# Patient Record
Sex: MALE | Race: OTHER | HISPANIC OR LATINO | URBAN - METROPOLITAN AREA
[De-identification: names, ages, dates, MRNs, and addresses within clinical notes are randomized per-mention and may not be internally consistent; named-entity substitution may affect disease eponyms.]

---

## 2022-11-23 ENCOUNTER — EMERGENCY (EMERGENCY)
Facility: HOSPITAL | Age: 37
LOS: 1 days | Discharge: HOME | End: 2022-11-23
Attending: STUDENT IN AN ORGANIZED HEALTH CARE EDUCATION/TRAINING PROGRAM
Payer: COMMERCIAL

## 2022-11-23 VITALS
TEMPERATURE: 98 F | DIASTOLIC BLOOD PRESSURE: 77 MMHG | SYSTOLIC BLOOD PRESSURE: 134 MMHG | RESPIRATION RATE: 20 BRPM | OXYGEN SATURATION: 98 % | HEART RATE: 73 BPM

## 2022-11-23 PROCEDURE — 99283 EMERGENCY DEPT VISIT LOW MDM: CPT

## 2022-11-23 PROCEDURE — 99284 EMERGENCY DEPT VISIT MOD MDM: CPT

## 2022-11-23 RX ORDER — IBUPROFEN 200 MG
600 TABLET ORAL ONCE
Refills: 0 | Status: COMPLETED | OUTPATIENT
Start: 2022-11-23 | End: 2022-11-23

## 2022-11-23 RX ORDER — CYCLOBENZAPRINE HYDROCHLORIDE 10 MG/1
1 TABLET, FILM COATED ORAL
Qty: 20 | Refills: 0
Start: 2022-11-23

## 2022-11-23 RX ORDER — LIDOCAINE 4 G/100G
1 CREAM TOPICAL ONCE
Refills: 0 | Status: COMPLETED | OUTPATIENT
Start: 2022-11-23 | End: 2022-11-23

## 2022-11-23 RX ORDER — CYCLOBENZAPRINE HYDROCHLORIDE 10 MG/1
1 TABLET, FILM COATED ORAL
Qty: 10 | Refills: 0
Start: 2022-11-23

## 2022-11-23 RX ADMIN — Medication 600 MILLIGRAM(S): at 19:52

## 2022-11-23 RX ADMIN — LIDOCAINE 1 PATCH: 4 CREAM TOPICAL at 19:52

## 2022-11-23 NOTE — ED PROVIDER NOTE - CLINICAL SUMMARY MEDICAL DECISION MAKING FREE TEXT BOX
Patient presents with bilateral lower back pain. No spinal tenderness, numbness, tingling, urinary issues. 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 Pain meds and muscle relaxer prescribed.

## 2022-11-23 NOTE — ED PROVIDER NOTE - OBJECTIVE STATEMENT
37 year old male presents with lower bilateral back pain for the last 3-4 days. Patient states the pain was initially mild and then got progressively worse. 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 Patient states he took a "pain pill" this morning but does not recall the name. Movement aggravates the pain. Denies chest pain, abdominal pain, neck pain, headache, nausea, vomiting, and SOB. Denies problems urinating.

## 2022-11-23 NOTE — ED PROVIDER NOTE - PATIENT PORTAL LINK FT
You can access the FollowMyHealth Patient Portal offered by [facility] by registering at the following website: [email or web address]. By joining [employer]’s FollowMyHealth portal, you will also be able to view your health information using other applications (apps) compatible with our system.

## 2022-11-23 NOTE — ED PROVIDER NOTE - NSFOLLOWUPINSTRUCTIONS_ED_ALL_ED_FT
Back Pain    Back pain is very common in adults. The cause of back pain is rarely dangerous and the pain often gets better over time. The cause of your back pain may not be known and may include strain of muscles or ligaments, degeneration of the spinal disks, or arthritis. Occasionally the pain may radiate down your leg(s). Over-the-counter medicines to reduce pain and inflammation are often the most helpful. Stretching and remaining active frequently helps the healing process.     SEEK IMMEDIATE MEDICAL CARE IF YOU HAVE ANY OF THE FOLLOWING SYMPTOMS: bowel or bladder control problems, unusual weakness or numbness in your arms or legs, nausea or vomiting, abdominal pain, fever, dizziness/lightheadedness.    Follow-up with your primary care provider in 1-2 days.  Do not operate heavy machinery or drive your car when taking cyclobenzaprine.

## 2022-11-23 NOTE — ED PROVIDER NOTE - NS ED ROS FT
MSK: lower bilateral back pain. Denies numbness and tingling down arms and legs. Full ROM. Denies neck pain. Denies headache.

## 2022-11-23 NOTE — ED PROVIDER NOTE - PHYSICAL EXAMINATION
MSK: bilateral lower back pain. Full ROM in tact. No spinal tenderness. No bruising noted to lower back.

## 2022-11-23 NOTE — ED ADULT NURSE NOTE - OBJECTIVE STATEMENT
pt to ED with complaints of back pain x 4 days. pt states pain is in lower medial back. pt states pain is 7/10 sharp pain. pt denies any radiation. pt states pain worsens with movement. pt denies any trauma. 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 pt states he took a medication for the pain this morning but is unable to recall the name.

## 2022-11-23 NOTE — ED PROVIDER NOTE - ATTENDING APP SHARED VISIT CONTRIBUTION OF CARE
Pt with a 4 d hx of atraumatic B/L lower back pain. pain worse with movement. no numbness/weakness. no bowel/bladder dysfunction. no CA hx.    physical - mild paraspinal lumbar ttp. normal gait. strength 5/5 B/L LE. sensation intact B/L LE.    plan - Pt with msk back pain. no red flags. given meds in Ed. Pt reassured and given return instructions.

## 2022-11-23 NOTE — ED PROVIDER NOTE - NS ED ATTENDING STATEMENT MOD
This was a shared visit with the ABDI. I reviewed and verified the documentation and independently performed the documented:

## 2024-08-26 NOTE — ED PROVIDER NOTE - CARDIOVASCULAR NEGATIVE STATEMENT, MLM
General Surgery Patient Discharge Instructions    Discharged To:  Home    RESUME ACTIVITY:     WOUND CARE:   Do not remove dressing for 48 hrs.     Leave staples in place until office visit.     BATHING:  Ok to shower in 24 hrs. Do not submerge surgical incisions in water (baths, pools, hot tubs, lakes) until cleared by surgeon at post operative follow up visit.    DRIVING: No driving while on pain medication, in pain, or until seen at follow up visit.    RETURN TO WORK:   Light activity    WALKING:    Yes    LIFTING: Less than 10-15 pounds for 6  weeks     DIET:   Ok to resume regular diet.     MEDICATIONS: Take medications as prescribed by physician. If prescribed narcotic medications (Norco, Percocet, or Roxicodone) attempt to wean off medications as soon as possible.    FOLLOW UP: Call and make appointment to follow up with Dr. Whyte for post op visit in 7-10 days.    
no chest pain and no edema.

## 2025-04-13 ENCOUNTER — EMERGENCY (EMERGENCY)
Facility: HOSPITAL | Age: 40
LOS: 1 days | End: 2025-04-13
Attending: EMERGENCY MEDICINE
Payer: COMMERCIAL

## 2025-04-13 VITALS
DIASTOLIC BLOOD PRESSURE: 90 MMHG | HEIGHT: 72 IN | SYSTOLIC BLOOD PRESSURE: 139 MMHG | TEMPERATURE: 98 F | WEIGHT: 184.97 LBS | HEART RATE: 61 BPM | OXYGEN SATURATION: 99 % | RESPIRATION RATE: 18 BRPM

## 2025-04-13 PROBLEM — Z78.9 OTHER SPECIFIED HEALTH STATUS: Chronic | Status: ACTIVE | Noted: 2022-11-23

## 2025-04-13 PROCEDURE — T1013: CPT

## 2025-04-13 PROCEDURE — 99283 EMERGENCY DEPT VISIT LOW MDM: CPT | Mod: 25

## 2025-04-13 PROCEDURE — 93010 ELECTROCARDIOGRAM REPORT: CPT

## 2025-04-13 PROCEDURE — 71046 X-RAY EXAM CHEST 2 VIEWS: CPT | Mod: 26

## 2025-04-13 PROCEDURE — 71046 X-RAY EXAM CHEST 2 VIEWS: CPT

## 2025-04-13 PROCEDURE — 99284 EMERGENCY DEPT VISIT MOD MDM: CPT

## 2025-04-13 PROCEDURE — 93005 ELECTROCARDIOGRAM TRACING: CPT

## 2025-04-13 RX ORDER — IBUPROFEN 200 MG
600 TABLET ORAL ONCE
Refills: 0 | Status: COMPLETED | OUTPATIENT
Start: 2025-04-13 | End: 2025-04-13

## 2025-04-13 RX ADMIN — Medication 600 MILLIGRAM(S): at 08:10
